# Patient Record
Sex: FEMALE | Race: WHITE | NOT HISPANIC OR LATINO | URBAN - METROPOLITAN AREA
[De-identification: names, ages, dates, MRNs, and addresses within clinical notes are randomized per-mention and may not be internally consistent; named-entity substitution may affect disease eponyms.]

---

## 2018-06-15 ENCOUNTER — OUTPATIENT (OUTPATIENT)
Dept: OUTPATIENT SERVICES | Facility: HOSPITAL | Age: 38
LOS: 1 days | End: 2018-06-15
Payer: COMMERCIAL

## 2018-06-15 ENCOUNTER — APPOINTMENT (OUTPATIENT)
Dept: RADIOLOGY | Facility: CLINIC | Age: 38
End: 2018-06-15
Payer: COMMERCIAL

## 2018-06-15 PROBLEM — Z00.00 ENCOUNTER FOR PREVENTIVE HEALTH EXAMINATION: Status: ACTIVE | Noted: 2018-06-15

## 2018-06-15 PROCEDURE — 72110 X-RAY EXAM L-2 SPINE 4/>VWS: CPT

## 2018-06-15 PROCEDURE — 72110 X-RAY EXAM L-2 SPINE 4/>VWS: CPT | Mod: 26

## 2018-06-15 PROCEDURE — 73110 X-RAY EXAM OF WRIST: CPT | Mod: 26,50

## 2018-06-15 PROCEDURE — 73110 X-RAY EXAM OF WRIST: CPT

## 2018-08-02 ENCOUNTER — APPOINTMENT (OUTPATIENT)
Dept: MRI IMAGING | Facility: CLINIC | Age: 38
End: 2018-08-02

## 2018-08-08 ENCOUNTER — APPOINTMENT (OUTPATIENT)
Dept: MRI IMAGING | Facility: CLINIC | Age: 38
End: 2018-08-08
Payer: COMMERCIAL

## 2018-08-08 ENCOUNTER — OUTPATIENT (OUTPATIENT)
Dept: OUTPATIENT SERVICES | Facility: HOSPITAL | Age: 38
LOS: 1 days | End: 2018-08-08
Payer: COMMERCIAL

## 2018-08-08 PROCEDURE — 72148 MRI LUMBAR SPINE W/O DYE: CPT | Mod: 26

## 2018-08-08 PROCEDURE — 72148 MRI LUMBAR SPINE W/O DYE: CPT

## 2019-07-28 ENCOUNTER — EMERGENCY (EMERGENCY)
Facility: HOSPITAL | Age: 39
LOS: 1 days | Discharge: HOME | End: 2019-07-28
Attending: EMERGENCY MEDICINE | Admitting: EMERGENCY MEDICINE
Payer: COMMERCIAL

## 2019-07-28 VITALS
OXYGEN SATURATION: 100 % | DIASTOLIC BLOOD PRESSURE: 90 MMHG | RESPIRATION RATE: 18 BRPM | HEART RATE: 101 BPM | SYSTOLIC BLOOD PRESSURE: 132 MMHG | TEMPERATURE: 98 F

## 2019-07-28 PROCEDURE — 21310: CPT

## 2019-07-28 PROCEDURE — 99284 EMERGENCY DEPT VISIT MOD MDM: CPT | Mod: 25

## 2019-07-28 PROCEDURE — 70160 X-RAY EXAM OF NASAL BONES: CPT | Mod: 26

## 2019-07-28 NOTE — ED PROVIDER NOTE - CLINICAL SUMMARY MEDICAL DECISION MAKING FREE TEXT BOX
Pt here s/p fall landing on her nose bridge on a gas tank. Will order xray. Pt here s/p fall landing on her nose bridge on a gas tank. Will order xray to r/o fracture.  ICE.  Constructed nasal pinching device to use if bleeding returns.      Care instructions discussed.  Will f/u with Dr. Macario of plastic surgery for nasal bone fracture.

## 2019-07-28 NOTE — ED ADULT TRIAGE NOTE - CHIEF COMPLAINT QUOTE
Patient to ED s/p fall and hit nose on gas tank.  Patient actively bleeding in triage.  Denies LOC.  No obvious deformity noted to nose

## 2019-07-28 NOTE — ED PROVIDER NOTE - NSFOLLOWUPINSTRUCTIONS_ED_ALL_ED_FT
-PLEASE FOLLOW-UP WITH DR. LAROSE OF PLASTIC SURGERY.  SEE ABOVE FOR CONTACT INFORMATION.     -TAKE OVER THE COUNTER TYLENOL 650MG BY MOUTH EVERY 4-6 HOURS AS NEEDED FOR PAIN.  DO NOT MIX WITH ALCOHOL OR OTHER PRESCRIPTION MEDICATIONS THAT ALREADY CONTAIN TYLENOL OR ACETAMINOPHEN.     -PLEASE RETURN TO THE ER IMMEDIATELY OR CALL 911 FOR ANY HIGH FEVER, TROUBLE BREATHING, VOMITING, SEVERE PAIN, OR ANY OTHER CONCERNS.    -PLEASE ICE THE AREA OF PAIN FOR THE NEXT 2 DAYS.  ICE FOR 20 MINUTES AT A TIME AT LEAST 3-4 TIMES A DAY.    Nasal Fracture    WHAT YOU NEED TO KNOW:    A nasal fracture is a crack or break in your nose. You may have a break in the upper nose (bridge), the side, or in the septum. The septum is in the middle of the nose and divides your nostrils. Nasal fractures are caused by a hard hit to the nose. They may be caused by a motor vehicle crash, sports injury, fall, or a fight.         DISCHARGE INSTRUCTIONS:    Return to the emergency department if:     You feel like one or both of your nasal passages are blocked and you have trouble breathing.       Clear fluid is leaking from your nose.      Your have severe nose pain, even after you take medicine.       You have double vision or have problems moving your eyes.     Contact your healthcare provider if:     You have a fever.       You continue to have nosebleeds.      You have a headache that gets worse, even after you take pain medicine.      Your splint or packing are loose.      You have questions about your condition or care.    Medicines:     Medicine may be given to decrease pain or help prevent a bacterial infection. Ask how to take pain medicine safely. Medicine may also be given to decrease nasal swelling and help make breathing easier.       Take your medicine as directed. Contact your healthcare provider if you think your medicine is not helping or if you have side effects. Tell him or her if you are allergic to any medicine. Keep a list of the medicines, vitamins, and herbs you take. Include the amounts, and when and why you take them. Bring the list or the pill bottles to follow-up visits. Carry your medicine list with you in case of an emergency.    Wound care: Ask your healthcare provider how to care for your wounds, splint, or packing.    How to care for your nasal fracture:     Apply ice on your nose for 15 to 20 minutes every hour or as directed. Use an ice pack, or put crushed ice in a plastic bag. Cover it with a towel. Ice helps prevent tissue damage and decreases swelling and pain.      Elevate your head when you lie down. This will help decrease swelling and pain. You may need to see a specialist 3 to 5 days later for tests or more treatment after swelling has decreased.       Protect your nose to prevent bleeding, bruising, or another fracture. Try not to bump your nose on anything. You may not be able to participate in sports for up to 6 weeks.     Follow up with a specialist or your healthcare provider in 2 to 5 days as directed: Write down any questions you have so you remember to ask them during your visits. Sometimes follow-up care is needed months or even years later to correct problems.

## 2019-07-28 NOTE — ED PROVIDER NOTE - DIAGNOSTIC INTERPRETATION
Xray nasal bones - nondisplaced nasal bone fracture, mild soft tissue swelling, no other fractures noted.

## 2019-07-28 NOTE — ED PROVIDER NOTE - OBJECTIVE STATEMENT
40 y/o Female presents to the ED for a nose injury resulting in epistaxis after a trip and fall landing on her nose. Pt hit her nose on what she thinks was a propane gas tank. Denies vision changes, numbness, tingling, headache, N/V/D, and LOC 40 y/o Female presents to the ED for a nose injury resulting in epistaxis after a trip and fall landing on her nose. Pt hit her nose on what she thinks was a propane gas tank. Denies vision changes, numbness, tingling, headache, N/V/D, and LOC.  Bleeding stopped with pressure.  Denies any anti-coagulation use.

## 2019-07-28 NOTE — ED PROVIDER NOTE - PHYSICAL EXAMINATION
VITAL SIGNS: I have reviewed nursing notes and confirm.  CONSTITUTIONAL: Well-developed; well-nourished; in no acute distress.  SKIN: Skin is warm and dry, no acute rash.  HEAD: Normocephalic; atraumatic.  EYES: PERRL, EOM intact; conjunctiva and sclera clear.  ENT: No nasal discharge; airway clear. Nose bridge slightly deviated to the right. Bleeding controlled.   NECK: Supple; non tender.  CARD: S1, S2 normal; no murmurs, gallops, or rubs. Regular rate and rhythm.  RESP: No wheezes, rales or rhonchi.  ABD: Normal bowel sounds; soft; non-distended; non-tender; no hepatosplenomegaly.  EXT: Normal ROM. No clubbing, cyanosis or edema.  NEURO: Alert, oriented. Grossly unremarkable.  PSYCH: Cooperative, appropriate. VITAL SIGNS: I have reviewed nursing notes and confirm.  CONSTITUTIONAL: Well-developed; well-nourished; in no acute distress.  SKIN: Skin is warm and dry, no acute rash.  HEAD: Normocephalic; atraumatic.  EYES: PERRL, EOM intact; conjunctiva and sclera clear.  ENT: No nasal discharge; airway clear. Nose bridge slightly deviated to the right. Bleeding controlled.   NECK: Supple; non tender.  CARD: S1, S2 normal; no murmurs, gallops, or rubs. Regular rate (86) and rhythm.  RESP: No wheezes, rales or rhonchi.  ABD: Normal bowel sounds; soft; non-distended; non-tender; no hepatosplenomegaly.  EXT: Normal ROM. No clubbing, cyanosis or edema.  NEURO: Alert, oriented. Grossly unremarkable.  PSYCH: Cooperative, appropriate.
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
## 2019-07-31 DIAGNOSIS — S09.92XA UNSPECIFIED INJURY OF NOSE, INITIAL ENCOUNTER: ICD-10-CM
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
## 2019-07-31 DIAGNOSIS — S02.2XXA FRACTURE OF NASAL BONES, INITIAL ENCOUNTER FOR CLOSED FRACTURE: ICD-10-CM
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
